# Patient Record
Sex: MALE | Race: OTHER | HISPANIC OR LATINO
[De-identification: names, ages, dates, MRNs, and addresses within clinical notes are randomized per-mention and may not be internally consistent; named-entity substitution may affect disease eponyms.]

---
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
## 2020-01-30 ENCOUNTER — APPOINTMENT (OUTPATIENT)
Dept: OTOLARYNGOLOGY | Facility: HOSPITAL | Age: 20
End: 2020-01-30

## 2020-02-12 ENCOUNTER — APPOINTMENT (OUTPATIENT)
Dept: OTOLARYNGOLOGY | Facility: CLINIC | Age: 20
End: 2020-02-12

## 2020-12-16 PROBLEM — H66.92 ACUTE OTITIS MEDIA, LEFT: Status: RESOLVED | Noted: 2018-12-05 | Resolved: 2020-12-16

## 2021-01-11 NOTE — H&P PST PEDIATRIC - HEIGHT/LENGTH IN CM
Manisha Ramirez Patient Age: 23 year old  MESSAGE: Interpreting service used: No      Obstetrics & Gynecology- Reason for call: Symptom- Other symptoms-   Symptom(s): Patients stomach is tender to touch and attempted to go to ER but sat for 2 hours with no treatment please advise  Medication prescribed isnt working            Connected caller to Call Center Triage Queue and routed message to provider's clinical support pool.         WEIGHT AND HEIGHT:   Wt Readings from Last 1 Encounters:   11/06/20 98.1 kg (216 lb 4 oz)     Ht Readings from Last 1 Encounters:   11/06/20 5' 7\" (1.702 m)     BMI Readings from Last 1 Encounters:   11/06/20 33.87 kg/m²       ALLERGIES:  Penicillins, Amoxicillin, and Cefazolin  Current Outpatient Medications   Medication   • hydroCORTisone (Anusol-HC) 2.5 % rectal cream   • norethindrone-ethinyl estradiol and ferrous fumarate (Minastrin 24 Fe) 1-20 MG-MCG(24) chewable tablet   • Accu-Chek FastClix Lancets Misc   • Blood Glucose Monitoring Suppl (BLOOD GLUCOSE MONITOR SYSTEM) w/Device Kit   • Lancets Misc. Kit   • blood glucose test strip   • Prenatal Vit-Fe Fumarate-FA (MULTIVITAMIN & MINERAL W/FOLIC ACID- PRENATAL) 27-1 MG Tab     No current facility-administered medications for this visit.      PHARMACY to use:           Pharmacy preference(s) on file:   [id number] - [place], [place] - [address]  [address] [address]  Phone: [phone] Fax: [phone]      CALL BACK INFO: Ok to leave response (including medical information) on answering machine      PCP: No Pcp         INS: Payor: [employer]UE [employer] [place] / Plan: PPO [id number] / Product Type: PPO MISC   PATIENT ADDRESS:  [address] [id number]       [phone]

## 2021-04-16 ENCOUNTER — APPOINTMENT (OUTPATIENT)
Dept: OTOLARYNGOLOGY | Facility: CLINIC | Age: 21
End: 2021-04-16

## 2021-07-22 ENCOUNTER — APPOINTMENT (OUTPATIENT)
Dept: OTOLARYNGOLOGY | Facility: CLINIC | Age: 21
End: 2021-07-22
Payer: COMMERCIAL

## 2021-07-22 VITALS — SYSTOLIC BLOOD PRESSURE: 126 MMHG | DIASTOLIC BLOOD PRESSURE: 76 MMHG | HEART RATE: 65 BPM

## 2021-07-22 PROCEDURE — 99214 OFFICE O/P EST MOD 30 MIN: CPT | Mod: 25

## 2021-07-22 PROCEDURE — 92567 TYMPANOMETRY: CPT

## 2021-07-22 PROCEDURE — 92557 COMPREHENSIVE HEARING TEST: CPT

## 2021-07-22 PROCEDURE — 31231 NASAL ENDOSCOPY DX: CPT

## 2021-07-22 RX ORDER — CEPHALEXIN 500 MG/1
500 CAPSULE ORAL 3 TIMES DAILY
Qty: 21 | Refills: 0 | Status: COMPLETED | COMMUNITY
Start: 2018-11-26 | End: 2021-07-22

## 2021-07-22 RX ORDER — OFLOXACIN OTIC 3 MG/ML
0.3 SOLUTION AURICULAR (OTIC) TWICE DAILY
Qty: 1 | Refills: 0 | Status: COMPLETED | COMMUNITY
Start: 2018-12-05 | End: 2021-07-22

## 2021-07-22 RX ORDER — SENNOSIDES 15 MG/1
15 TABLET, COATED ORAL
Qty: 20 | Refills: 0 | Status: COMPLETED | COMMUNITY
Start: 2018-11-26 | End: 2021-07-22

## 2021-07-22 RX ORDER — FLUTICASONE PROPIONATE 50 UG/1
50 SPRAY, METERED NASAL TWICE DAILY
Qty: 1 | Refills: 0 | Status: COMPLETED | COMMUNITY
Start: 2017-04-19 | End: 2021-07-22

## 2021-08-29 NOTE — DATA REVIEWED
[de-identified] : Audiogram ordered to assess for sensorineural hearing loss\par Results:\par Moderate to mild mixed hearing loss 250-8kHz AD. Moderate to mild mixed hearing loss 250-8kHz. Type B tympanogram consistent with restricted TM mobility AD. Type A (normal) tympanogram AS.

## 2021-08-29 NOTE — CONSULT LETTER
[Dear  ___] : Dear  [unfilled], [Consult Letter:] : I had the pleasure of evaluating your patient, [unfilled]. [Please see my note below.] : Please see my note below. [Consult Closing:] : Thank you very much for allowing me to participate in the care of this patient.  If you have any questions, please do not hesitate to contact me. [Sincerely,] : Sincerely, [FreeTextEntry3] : Chandler France MD, PhD\par Chief, Division of Laryngology\par Department of Otolaryngology\par [facility]\par Pediatric Otolaryngology, [facility]\par  of Otolaryngology\par [employer]\par

## 2021-08-29 NOTE — HISTORY OF PRESENT ILLNESS
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 Breathing well through nose. Denies otorrhea, otalgia. Reports feels ear tubes are loose and hearing decreased. Reports intermittent tinnitus. Denies dizziness. Tolerate fluids well. Denies recent infections , no abx. Denies snoring. Had to cancel appointment for palate repair surgery with Dr. Og and Dr Heda in 2019 due to broken ankle, has not rescheduled. States was in speech therapy and discontinued. Trouble with speech.\par

## 2021-08-29 NOTE — PHYSICAL EXAM
[Midline] : trachea located in midline position [Placement/Patency] : tympanostomy tube in place and patent [1+] : 1+ [Clear to Auscultation] : lungs were clear to auscultation bilaterally [Normal Gait and Station] : normal gait and station [Normal muscle strength, symmetry and tone of facial, head and neck musculature] : normal muscle strength, symmetry and tone of facial, head and neck musculature [Normal] : no cervical lymphadenopathy [de-identified] : AU CI [de-identified] : no tubes [Exposed Vessel] : left anterior vessel not exposed [Wheezing] : no wheezing [Increased Work of Breathing] : no increased work of breathing with use of accessory muscles and retractions

## 2021-08-29 NOTE — REASON FOR VISIT
[Subsequent Evaluation] : a subsequent evaluation for [FreeTextEntry2] : follow up s/p Bilateral myringotomy and tympanostomy tube insertions with T tubes.  Septoplasty, 11/26/2018.

## 2022-04-15 ENCOUNTER — OUTPATIENT (OUTPATIENT)
Dept: OUTPATIENT SERVICES | Facility: HOSPITAL | Age: 22
LOS: 1 days | End: 2022-04-15

## 2022-04-15 VITALS
SYSTOLIC BLOOD PRESSURE: 126 MMHG | WEIGHT: 158.07 LBS | RESPIRATION RATE: 16 BRPM | HEART RATE: 64 BPM | DIASTOLIC BLOOD PRESSURE: 72 MMHG | HEIGHT: 65 IN | TEMPERATURE: 99 F | OXYGEN SATURATION: 97 %

## 2022-04-15 DIAGNOSIS — Z98.890 OTHER SPECIFIED POSTPROCEDURAL STATES: Chronic | ICD-10-CM

## 2022-04-15 DIAGNOSIS — Q38.5 CONGENITAL MALFORMATIONS OF PALATE, NOT ELSEWHERE CLASSIFIED: Chronic | ICD-10-CM

## 2022-04-15 DIAGNOSIS — H65.23 CHRONIC SEROUS OTITIS MEDIA, BILATERAL: ICD-10-CM

## 2022-04-15 LAB
BLD GP AB SCN SERPL QL: NEGATIVE — SIGNIFICANT CHANGE UP
HCT VFR BLD CALC: 43.6 % — SIGNIFICANT CHANGE UP (ref 39–50)
HGB BLD-MCNC: 14.5 G/DL — SIGNIFICANT CHANGE UP (ref 13–17)
MCHC RBC-ENTMCNC: 29.7 PG — SIGNIFICANT CHANGE UP (ref 27–34)
MCHC RBC-ENTMCNC: 33.3 GM/DL — SIGNIFICANT CHANGE UP (ref 32–36)
MCV RBC AUTO: 89.2 FL — SIGNIFICANT CHANGE UP (ref 80–100)
NRBC # BLD: 0 /100 WBCS — SIGNIFICANT CHANGE UP
NRBC # FLD: 0 K/UL — SIGNIFICANT CHANGE UP
PLATELET # BLD AUTO: 317 K/UL — SIGNIFICANT CHANGE UP (ref 150–400)
RBC # BLD: 4.89 M/UL — SIGNIFICANT CHANGE UP (ref 4.2–5.8)
RBC # FLD: 11.9 % — SIGNIFICANT CHANGE UP (ref 10.3–14.5)
RH IG SCN BLD-IMP: POSITIVE — SIGNIFICANT CHANGE UP
WBC # BLD: 11.99 K/UL — HIGH (ref 3.8–10.5)
WBC # FLD AUTO: 11.99 K/UL — HIGH (ref 3.8–10.5)

## 2022-04-15 RX ORDER — ACETAMINOPHEN 500 MG
2 TABLET ORAL
Qty: 0 | Refills: 0 | DISCHARGE

## 2022-04-15 RX ORDER — SODIUM CHLORIDE 9 MG/ML
1000 INJECTION, SOLUTION INTRAVENOUS
Refills: 0 | Status: DISCONTINUED | OUTPATIENT
Start: 2022-04-22 | End: 2022-05-06

## 2022-04-15 RX ORDER — IBUPROFEN 200 MG
2 TABLET ORAL
Qty: 0 | Refills: 0 | DISCHARGE

## 2022-04-15 NOTE — H&P PST ADULT - NEGATIVE GENERAL GENITOURINARY SYMPTOMS
Universal Safety Interventions no hematuria/no flank pain L/no flank pain R/no bladder infections/no incontinence/no dysuria

## 2022-04-15 NOTE — H&P PST ADULT - HEMATOLOGY/LYMPHATICS
OCCUPATIONAL THERAPY QUICK EVALUATION - INPATIENT    Room Number: 525/525-A  Evaluation Date: 2/15/2018     Type of Evaluation: Initial  Presenting Problem: influenza    Physician Order: IP Consult to Occupational Therapy  Reason for Therapy:  ADL/IADL Dys Activity Short Form   How much help from another person does the patient currently need…  -   Putting on and taking off regular lower body clothing?: None   -   Bathing (including washing, rinsing, drying)?: None  -   Toileting, which includes using toilet w/ all needs in reach. Chair alarm on and daughter at bedside. No further OT contact planned.  RN aware of medication management concerns and need for possible assist     OT Discharge Recommendations: 24 hour care/supervision;Home with home health PT  CALLIE Alonzo details…

## 2022-04-15 NOTE — H&P PST ADULT - HISTORY OF PRESENT ILLNESS
22 yo male with preop dx. chronic serous otitis media bilateral presents to pre surgical testing.  Pt with h/o Goldenar syndrome with h/o palate surgery and s/p myringotomy and tympanostomy tube insertion with t tubes, septoplasty in 2018.  Pt reports hearing decrease and tinnitus, and speech issues.   Pt is scheduled for bilateral myringotomy and tympanostomy tubes, pharyngoplasty.

## 2022-04-15 NOTE — H&P PST ADULT - PROBLEM SELECTOR PLAN 1
Pt is scheduled for bilateral myringotomy and tympanostomy tubes, pharyngoplasty on 4/22/22.  Verbal and written pre op instructions reviewed with patient and pt able to verbalize understanding.  Pt instructed to follow surgeon's guidelines regarding COVID testing preop.

## 2022-04-15 NOTE — H&P PST ADULT - NSICDXPASTMEDICALHX_GEN_ALL_CORE_FT
PAST MEDICAL HISTORY:  Chronic serous otitis media, bilateral     Conductive hearing loss, bilateral     Deviated nasal septum     Goldenhar syndrome     Hypertrophy of nasal turbinates     Mandibular hypoplasia     Maxillary hypoplasia     Other lesions of oral mucosa     Other specified disorders of nose and nasal sinuses     Sleep disorder breathing

## 2022-04-15 NOTE — H&P PST ADULT - NSICDXPASTSURGICALHX_GEN_ALL_CORE_FT
PAST SURGICAL HISTORY:  Abnormality of palatal shelf [facility] palatal distraction device for palatal expansion. Placed at 8 years of age.  [facility]. 11/2008  No complications.    H/O myringotomy with tubes at 1-3 y/o  myringotomy and tympanostomy tube insertion with t tubes, septoplasty in 2018    H/O oral surgery H/o maxillary Lefort 1 Osteotomy on 09/8/16    H/O palate surgery Palate expansion at 6 y/o.

## 2022-04-15 NOTE — H&P PST ADULT - ENMT COMMENTS
preop dx. chronic serous otitis media bilateral preop dx. chronic serous otitis media bilateral, h/o Goldenhar syndrome irregular contour of palate

## 2022-04-21 ENCOUNTER — TRANSCRIPTION ENCOUNTER (OUTPATIENT)
Age: 22
End: 2022-04-21

## 2022-04-21 NOTE — ASU PATIENT PROFILE, ADULT - NSICDXPASTSURGICALHX_GEN_ALL_CORE_FT
PAST SURGICAL HISTORY:  Abnormality of palatal shelf [facility] palatal distraction device for palatal expansion. Placed at 8 years of age.  [facility]. 11/2008  No complications.    H/O myringotomy with tubes at 1-3 y/o  myringotomy and tympanostomy tube insertion with t tubes, septoplasty in 2018    H/O oral surgery H/o maxillary Lefort 1 Osteotomy on 09/8/16    H/O palate surgery Palate expansion at 4 y/o.

## 2022-04-22 ENCOUNTER — OUTPATIENT (OUTPATIENT)
Dept: OUTPATIENT SERVICES | Facility: HOSPITAL | Age: 22
LOS: 1 days | Discharge: HOME | End: 2022-04-22
Payer: COMMERCIAL

## 2022-04-22 ENCOUNTER — TRANSCRIPTION ENCOUNTER (OUTPATIENT)
Age: 22
End: 2022-04-22

## 2022-04-22 ENCOUNTER — APPOINTMENT (OUTPATIENT)
Dept: OTOLARYNGOLOGY | Facility: HOSPITAL | Age: 22
End: 2022-04-22

## 2022-04-22 VITALS
HEART RATE: 81 BPM | RESPIRATION RATE: 16 BRPM | SYSTOLIC BLOOD PRESSURE: 136 MMHG | TEMPERATURE: 98 F | DIASTOLIC BLOOD PRESSURE: 70 MMHG | OXYGEN SATURATION: 96 %

## 2022-04-22 VITALS
WEIGHT: 158.07 LBS | OXYGEN SATURATION: 97 % | DIASTOLIC BLOOD PRESSURE: 54 MMHG | HEIGHT: 65 IN | RESPIRATION RATE: 16 BRPM | SYSTOLIC BLOOD PRESSURE: 114 MMHG | TEMPERATURE: 98 F | HEART RATE: 53 BPM

## 2022-04-22 DIAGNOSIS — H65.23 CHRONIC SEROUS OTITIS MEDIA, BILATERAL: ICD-10-CM

## 2022-04-22 DIAGNOSIS — Z98.890 OTHER SPECIFIED POSTPROCEDURAL STATES: Chronic | ICD-10-CM

## 2022-04-22 DIAGNOSIS — Q38.5 CONGENITAL MALFORMATIONS OF PALATE, NOT ELSEWHERE CLASSIFIED: Chronic | ICD-10-CM

## 2022-04-22 PROCEDURE — 31231 NASAL ENDOSCOPY DX: CPT

## 2022-04-22 PROCEDURE — 42950 RECONSTRUCTION OF THROAT: CPT

## 2022-04-22 PROCEDURE — 69436 CREATE EARDRUM OPENING: CPT | Mod: 50

## 2022-04-22 DEVICE — GEL PROLARYN PLUS 1 CC SYR W TRANS ORAL NDL: Type: IMPLANTABLE DEVICE | Status: FUNCTIONAL

## 2022-04-22 DEVICE — TUBE VENT T 1.32X4.75MM: Type: IMPLANTABLE DEVICE | Status: FUNCTIONAL

## 2022-04-22 RX ORDER — OFLOXACIN 0.3 %
5 DROPS OPHTHALMIC (EYE)
Refills: 0 | Status: DISCONTINUED | OUTPATIENT
Start: 2022-04-22 | End: 2022-05-06

## 2022-04-22 RX ORDER — OFLOXACIN 0.3 %
5 DROPS OPHTHALMIC (EYE)
Qty: 0 | Refills: 0 | DISCHARGE
Start: 2022-04-22

## 2022-04-22 RX ORDER — ONDANSETRON 8 MG/1
4 TABLET, FILM COATED ORAL EVERY 6 HOURS
Refills: 0 | Status: DISCONTINUED | OUTPATIENT
Start: 2022-04-22 | End: 2022-05-06

## 2022-04-22 RX ORDER — IBUPROFEN 200 MG
600 TABLET ORAL EVERY 6 HOURS
Refills: 0 | Status: DISCONTINUED | OUTPATIENT
Start: 2022-04-22 | End: 2022-05-06

## 2022-04-22 RX ORDER — SODIUM CHLORIDE 9 MG/ML
1000 INJECTION, SOLUTION INTRAVENOUS
Refills: 0 | Status: DISCONTINUED | OUTPATIENT
Start: 2022-04-22 | End: 2022-05-06

## 2022-04-22 RX ORDER — ACETAMINOPHEN 500 MG
650 TABLET ORAL EVERY 6 HOURS
Refills: 0 | Status: DISCONTINUED | OUTPATIENT
Start: 2022-04-22 | End: 2022-05-06

## 2022-04-22 RX ORDER — ACETAMINOPHEN 500 MG
650 TABLET ORAL ONCE
Refills: 0 | Status: DISCONTINUED | OUTPATIENT
Start: 2022-04-22 | End: 2022-05-06

## 2022-04-22 RX ADMIN — SODIUM CHLORIDE 75 MILLILITER(S): 9 INJECTION, SOLUTION INTRAVENOUS at 09:20

## 2022-04-22 NOTE — ASU DISCHARGE PLAN (ADULT/PEDIATRIC) - CARE PROVIDER_API CALL
Chandler France  OTOLARYNGOLOGY  [address] [address] [place] [address]  Phone: [phone]  Fax: [phone]  Follow Up Time:    Yes

## 2022-04-22 NOTE — ASU DISCHARGE PLAN (ADULT/PEDIATRIC) - NS MD DC FALL RISK RISK
For information on Fall & Injury Prevention, visit: [email or web address] OR  [email or web address] OR  https://www.cdc.gov/steadi/patient.html

## 2022-04-22 NOTE — ASU DISCHARGE PLAN (ADULT/PEDIATRIC) - FOLLOW UP APPOINTMENTS
[facility] may also call Recovery Room (PACU) 24/7 @ [phone]/[facility]

## 2022-04-22 NOTE — BRIEF OPERATIVE NOTE - NSICDXBRIEFPROCEDURE_GEN_ALL_CORE_FT
PROCEDURES:  Myringotomy, with tympanostomy tube insertion 22-Apr-2022 07:28:40  Fatemeh Acosta  Pharyngoplasty 22-Apr-2022 07:28:46  Fatemeh Acosta

## 2022-04-22 NOTE — ASU DISCHARGE PLAN (ADULT/PEDIATRIC) - CALL YOUR DOCTOR IF YOU HAVE ANY OF THE FOLLOWING:
Wound/Surgical Site with redness, or foul smelling discharge or pus Wound/Surgical Site with redness, or foul smelling discharge or pus/Inability to tolerate liquids or foods Bleeding that does not stop/Swelling that gets worse/Pain not relieved by Medications/Fever greater than (need to indicate Fahrenheit or Celsius)/Wound/Surgical Site with redness, or foul smelling discharge or pus/Numbness, tingling, color or temperature change to extremity/Nausea and vomiting that does not stop/Unable to urinate/Inability to tolerate liquids or foods

## 2022-04-22 NOTE — BRIEF OPERATIVE NOTE - NSICDXBRIEFPOSTOP_GEN_ALL_CORE_FT
POST-OP DIAGNOSIS:  Velopharyngeal insufficiency, acquired 22-Apr-2022 07:29:02  Fatemeh Acosta  Chronic otitis media 22-Apr-2022 07:29:06  Fatemeh Acosta

## 2022-04-22 NOTE — ASU DISCHARGE PLAN (ADULT/PEDIATRIC) - NURSING INSTRUCTIONS
Please report any signs and symptoms of infection including Fever (Temp >101 or >100.4 if GYN procedure), uncontrollable nausea, vomiting, diarrhea, chills & inability to urinate. Shower with soap & water when appropriate, pat dry with clean towel & no ointments, creams, powders or lotions on incisions unless okayed by MD. Please report any puss or increased drainage from incision sites, or if redness develops and spreads around sites. Please practice good hand hygiene especially after using the bathroom. Follow up with all MD appointments and take medication(s) as prescribed  For the first 2 weeks - sneeze with mouth open, do NOT blow nose, avoid strenuous physical activity. Keep soap and water out of ear.

## 2022-04-22 NOTE — BRIEF OPERATIVE NOTE - NSICDXBRIEFPREOP_GEN_ALL_CORE_FT
PRE-OP DIAGNOSIS:  Velopharyngeal insufficiency, acquired 22-Apr-2022 07:28:51  Fatemeh Acosta  Chronic otitis media 22-Apr-2022 07:28:57  Fatemeh Acosta

## 2022-04-22 NOTE — BRIEF OPERATIVE NOTE - OPERATION/FINDINGS
stenotic R ear canal with friable tissue along anterior canal. retained t-tube removed, new t-tube placed in posterior TM. L ear without retained tube, no fluid, s/p t-tube insertion. Injection pharyngoplasty.

## 2022-05-17 ENCOUNTER — APPOINTMENT (OUTPATIENT)
Dept: OTOLARYNGOLOGY | Facility: CLINIC | Age: 22
End: 2022-05-17

## 2022-05-19 NOTE — ASU PREOP CHECKLIST - DNR CLARIFICATION FORM COMPLETED
[PCV 13] : PCV 13 [Influenza] : Influenza [FreeTextEntry1] : Doing well on Nutramigen, development progressing, so happy and engaging, doing well at  now.  Questions answered, no concerns. n/a

## 2022-06-30 NOTE — H&P PST ADULT - AIRWAY
Doxycycline Counseling:  Patient counseled regarding possible photosensitivity and increased risk for sunburn.  Patient instructed to avoid sunlight, if possible.  When exposed to sunlight, patients should wear protective clothing, sunglasses, and sunscreen.  The patient was instructed to call the office immediately if the following severe adverse effects occur:  hearing changes, easy bruising/bleeding, severe headache, or vision changes.  The patient verbalized understanding of the proper use and possible adverse effects of doxycycline.  All of the patient's questions and concerns were addressed. normal

## 2022-08-10 ENCOUNTER — RESULT REVIEW (OUTPATIENT)
Age: 22
End: 2022-08-10

## 2022-08-24 NOTE — PHYSICAL EXAM
[Placement/Patency] : tympanostomy tube in place and patent [Clear to Auscultation] : lungs were clear to auscultation bilaterally [Normal Gait and Station] : normal gait and station [Normal muscle strength, symmetry and tone of facial, head and neck musculature] : normal muscle strength, symmetry and tone of facial, head and neck musculature [Normal] : no cervical lymphadenopathy [Exposed Vessel] : left anterior vessel not exposed [1+] : 1+ [Wheezing] : no wheezing [Increased Work of Breathing] : no increased work of breathing with use of accessory muscles and retractions [de-identified] : large debris [de-identified] : large debris Enbrel Counseling:  I discussed with the patient the risks of etanercept including but not limited to myelosuppression, immunosuppression, autoimmune hepatitis, demyelinating diseases, lymphoma, and infections.  The patient understands that monitoring is required including a PPD at baseline and must alert us or the primary physician if symptoms of infection or other concerning signs are noted.

## 2023-02-25 ENCOUNTER — NON-APPOINTMENT (OUTPATIENT)
Age: 23
End: 2023-02-25

## 2023-03-02 ENCOUNTER — APPOINTMENT (OUTPATIENT)
Dept: ORTHOPEDIC SURGERY | Facility: CLINIC | Age: 23
End: 2023-03-02

## 2023-09-01 ENCOUNTER — APPOINTMENT (OUTPATIENT)
Dept: ORTHOPEDIC SURGERY | Facility: CLINIC | Age: 23
End: 2023-09-01
Payer: COMMERCIAL

## 2023-09-01 VITALS — WEIGHT: 150 LBS | HEIGHT: 66 IN | BODY MASS INDEX: 24.11 KG/M2

## 2023-09-01 DIAGNOSIS — S83.511A SPRAIN OF ANTERIOR CRUCIATE LIGAMENT OF RIGHT KNEE, INITIAL ENCOUNTER: ICD-10-CM

## 2023-09-01 PROCEDURE — 99204 OFFICE O/P NEW MOD 45 MIN: CPT

## 2023-09-01 NOTE — DISCUSSION/SUMMARY
[de-identified] : xray of the right knee was reviewed Patient should obtain mri of the r knee to evaluate for ACL tear  Discussed possible sx intervention for ACL tear (ACL reconstruction)  ----------------------------------------------------------------------------  All relevant imaging studies pertinent to today's visit, including x-rays, MRI's and/or other advanced imaging studies (CT/etc) were independently interpreted and reviewed with the patient as needed. Implications of the studies together with the patient's clinical picture were discussed to formulate a working diagnosis and management options were detailed.  The patient was advised of the diagnosis.  The natural history of the pathology was explained in full. All questions were answered.  The risks and benefits of conservative and interventional treatment alternatives were explained to the patient  ----------------------------------------------------------------------------  The patient was advised that an advanced diagnostic/imaging study (MRI/CT/etc) will be ordered to evaluate potential pathology in the affected area(s).  The patient was further advised to follow up in the office to review the results of the study and determine further management that may be indicated.

## 2023-09-01 NOTE — HISTORY OF PRESENT ILLNESS
[Sudden] : sudden [7] : 7 [0] : 0 [Dull/Aching] : dull/aching [Sharp] : sharp [Stabbing] : stabbing [Frequent] : frequent [Leisure] : leisure [Rest] : rest [Exercising] : exercising [de-identified] : 22 pt jumped over a fence and landed wrong causing pain in the knee.  Notes he visited physiican and told there was something wrong. notes he felt somthing popped out. injury 12/2022. patient reports instability of knee and pain with running and jumpiing [] : Post Surgical Visit: no [FreeTextEntry1] : right knee  [de-identified] : xrays

## 2023-09-01 NOTE — IMAGING
[de-identified] :   ----------------------------------------------------------------------------   Right knee exam:   Skin: no significant pertinent finding,  Inspection:     (neg) Effusion     (neg) Malalignment     (neg) Swelling     (neg) Quad atrophy     (neg) J-sign  ROM:     0 - 135 degrees of flexion.  Tenderness:     (neg) MJLT     (+) LJLT     (+) Medial patellar facet tenderness     (neg) Lateral patellar facet tenderness     (neg) Crepitus     (neg) Patellar grind tenderness     (neg) Patellar tendon     (neg) Quad tendon     Other:  Stability:     (+3) Lachman     (neg) Varus/Valgus instability     (neg) Posterior drawer     (neg) Patellar translation: wnl  Additional tests:     (neg) McMurrays test     (neg) Patellar apprehension     Other:  Strength: 5/5 Q/H/TA/GS/EHL  Neuro: In tact to light touch throughout, DTR's wnl  Vascularity: Extremity warm and well perfused  popping of the knee  Gait: normal    [Right] : right knee [All Views] : anteroposterior, lateral, skyline, and anteroposterior standing [There are no fractures, subluxations or dislocations. No significant abnormalities are seen] : There are no fractures, subluxations or dislocations. No significant abnormalities are seen

## 2023-09-05 ENCOUNTER — APPOINTMENT (OUTPATIENT)
Dept: MRI IMAGING | Facility: CLINIC | Age: 23
End: 2023-09-05
Payer: COMMERCIAL

## 2023-09-05 PROCEDURE — 73721 MRI JNT OF LWR EXTRE W/O DYE: CPT | Mod: RT

## 2023-10-06 ENCOUNTER — APPOINTMENT (OUTPATIENT)
Dept: ORTHOPEDIC SURGERY | Facility: CLINIC | Age: 23
End: 2023-10-06
